# Patient Record
Sex: FEMALE | Race: WHITE | NOT HISPANIC OR LATINO | Employment: FULL TIME | URBAN - METROPOLITAN AREA
[De-identification: names, ages, dates, MRNs, and addresses within clinical notes are randomized per-mention and may not be internally consistent; named-entity substitution may affect disease eponyms.]

---

## 2018-05-15 ENCOUNTER — OFFICE VISIT (OUTPATIENT)
Dept: CARDIOLOGY | Facility: CLINIC | Age: 48
End: 2018-05-15
Payer: COMMERCIAL

## 2018-05-15 VITALS
WEIGHT: 166.5 LBS | SYSTOLIC BLOOD PRESSURE: 106 MMHG | OXYGEN SATURATION: 97 % | DIASTOLIC BLOOD PRESSURE: 67 MMHG | HEART RATE: 66 BPM

## 2018-05-15 DIAGNOSIS — I49.3 PVC'S (PREMATURE VENTRICULAR CONTRACTIONS): Primary | ICD-10-CM

## 2018-05-15 DIAGNOSIS — I49.3 PVC'S (PREMATURE VENTRICULAR CONTRACTIONS): ICD-10-CM

## 2018-05-15 DIAGNOSIS — R00.2 PALPITATIONS: ICD-10-CM

## 2018-05-15 LAB
BASOPHILS # BLD AUTO: 0 10E9/L (ref 0–0.2)
BASOPHILS NFR BLD AUTO: 0.2 %
DIFFERENTIAL METHOD BLD: NORMAL
EOSINOPHIL # BLD AUTO: 0.1 10E9/L (ref 0–0.7)
EOSINOPHIL NFR BLD AUTO: 2.2 %
ERYTHROCYTE [DISTWIDTH] IN BLOOD BY AUTOMATED COUNT: 13 % (ref 10–15)
HCT VFR BLD AUTO: 40.2 % (ref 35–47)
HGB BLD-MCNC: 13.1 G/DL (ref 11.7–15.7)
LYMPHOCYTES # BLD AUTO: 1.6 10E9/L (ref 0.8–5.3)
LYMPHOCYTES NFR BLD AUTO: 25.8 %
MCH RBC QN AUTO: 30.7 PG (ref 26.5–33)
MCHC RBC AUTO-ENTMCNC: 32.6 G/DL (ref 31.5–36.5)
MCV RBC AUTO: 94 FL (ref 78–100)
MONOCYTES # BLD AUTO: 0.6 10E9/L (ref 0–1.3)
MONOCYTES NFR BLD AUTO: 8.9 %
NEUTROPHILS # BLD AUTO: 4 10E9/L (ref 1.6–8.3)
NEUTROPHILS NFR BLD AUTO: 62.9 %
PLATELET # BLD AUTO: 261 10E9/L (ref 150–450)
RBC # BLD AUTO: 4.27 10E12/L (ref 3.8–5.2)
WBC # BLD AUTO: 6.3 10E9/L (ref 4–11)

## 2018-05-15 PROCEDURE — 99205 OFFICE O/P NEW HI 60 MIN: CPT | Performed by: INTERNAL MEDICINE

## 2018-05-15 PROCEDURE — 85025 COMPLETE CBC W/AUTO DIFF WBC: CPT | Performed by: INTERNAL MEDICINE

## 2018-05-15 PROCEDURE — 36415 COLL VENOUS BLD VENIPUNCTURE: CPT | Performed by: INTERNAL MEDICINE

## 2018-05-15 NOTE — MR AVS SNAPSHOT
After Visit Summary   5/15/2018    Melva Cazares    MRN: [id number]           Patient Information     Date Of Birth          1970        Visit Information        Provider Department      5/15/2018 10:30 AM Maryam Driver MD [facility] AT [facility]        Today's Diagnoses     PVC's (premature ventricular contractions)    -  1      Care Instructions    Thank you for coming to the [facility] Heart @ [facility]; please note the following instructions:    1. Dr. Maryam Driver has requested you to have the following blood test(s) CBC with Platelets. Please return downstairs to the main lobby to re-check in for your labs before you leave for home today. We will follow up with you once the results are posted.     2.  Dr. Maryam Driver has ordered an echocardiogram to be performed.  We have scheduled your echocardiogram appointment at the  [facility] Imaging Department ([address] [place] [address]) for 5/21/18 at 9:00am.  Please arrive 15 minutes early to allow time for registration.  The Cardiology Nurse will contact you regarding the results (please see result notification details at bottom of summary).    Echocardiogram Instructions:  -Wear comfortable clothing  -Refrain from wearing perfumes or scented lotions      3. Please call when you are having symptoms of heart fluttering and we will set up an appointment for you to come in and have a heart monitor applied.  Please call us at [phone].        If you have any questions regarding your visit please contact your care team:     Cardiology  Telephone Number   Ramandeep BOWMAN, CASSIA MILLIGAN, RN   Fifi LEE, ASHLY CURTIS MA   [phone]    *After hours: [phone]   For scheduling appts:     [phone] or    [phone] (select option 1)    *After hours: [phone]     For the Device Clinic (Pacemakers and ICD's)  RN's :  Jayleen Caba   During business hours:  [phone]    *After business hours:  [phone] (select option 4)      Normal test result notifications will be released via Rapt Media[employer] or mailed within 7 business days.  All other test results, will be communicated via telephone once reviewed by your cardiologist.    If you need a medication refill please contact your pharmacy.  Please allow 3 business days for your refill to be completed.    As always, thank you for trusting us with your health care needs!                  Follow-ups after your visit        Your next 10 appointments already scheduled     May 21, 2018  9:00 AM CDT   Ech Complete with [id number]   [facility] [place] ([facility])    [address] [place] [id number] [phone]           1. Please bring or wear a comfortable two-piece outfit. 2. You may eat, drink and take your normal medicines. 3. For any questions that cannot be answered, please contact the ordering physician              Future tests that were ordered for you today     Open Future Orders        Priority Expected Expires Ordered    Zio Patch Holter Routine 5/15/2018 3/14/2019 5/15/2018    CBC with platelets differential Routine 5/15/2018 7/15/2018 5/15/2018    Echocardiogram Routine 5/15/2018 9/15/2018 5/15/2018            Who to contact     If you have questions or need follow up information about today's clinic visit or your schedule please contact [facility] directly at [phone].  Normal or non-critical lab and imaging results will be communicated to you by MyChart, letter or phone within 4 business days after the clinic has received the results. If you do not hear from us within 7 days, please contact the clinic through MyChart or phone. If you have a critical or abnormal lab result, we will notify you by phone as soon as possible.  Submit refill requests through Plangot or call your pharmacy and they will forward  "the refill request to us. Please allow 3 business days for your refill to be completed.          Additional Information About Your Visit        MyChart Information     Ketto lets you send messages to your doctor, view your test results, renew your prescriptions, schedule appointments and more. To sign up, go to [email or web address] . Click on \"Log in\" on the left side of the screen, which will take you to the Welcome page. Then click on \"Sign up Now\" on the right side of the page.     You will be asked to enter the access code listed below, as well as some personal information. Please follow the directions to create your username and password.     Your access code is: [email or web address]  Expires: 2018 11:57 AM     Your access code will  in 90 days. If you need help or a new code, please call your [facility] clinic or [phone].        Care EveryWhere ID     This is your Care EveryWhere ID. This could be used by other organizations to access your [facility] medical records  [id number]        Your Vitals Were     Pulse Pulse Oximetry                66 97%           Blood Pressure from Last 3 Encounters:   05/15/18 106/67   11 121/71    Weight from Last 3 Encounters:   05/15/18 75.5 kg (166 lb 8 oz)               Primary Care Provider    None Specified       No primary provider on file.        Equal Access to Services     [facility] : Hadii kiki messero Sosameer, waaxda luqadaha, qaybta kaalmada burak, venkatesh yang. So [facility] [phone].    ATENCIÓN: Si habla español, tiene a perdomo disposición servicios gratuitos de asistencia lingüística. Llame al [phone].    We comply with applicable federal civil rights laws and [place] laws. We do not discriminate on the basis of race, color, national origin, age, disability, sex, sexual orientation, or gender identity.            Thank you!     Thank you for choosing [facility] AT [place] " FABIEN  for your care. Our goal is always to provide you with excellent care. Hearing back from our patients is one way we can continue to improve our services. Please take a few minutes to complete the written survey that you may receive in the mail after your visit with us. Thank you!             Your Updated Medication List - Protect others around you: Learn how to safely use, store and throw away your medicines at www.disposemymeds.org.          This list is accurate as of 5/15/18 11:57 AM.  Always use your most recent med list.                   Brand Name Dispense Instructions for use Diagnosis    CLARITIN PO      Take  by mouth.        MULTIVITAMIN ADULT PO

## 2018-05-15 NOTE — NURSING NOTE
Cardiac Monitors: Patient was instructed regarding the indication, function, care and prompt return of a holter  monitor (when pt feels palpitations, pt to call to have monitor placed). The monitor was placed on the patient with instructions regarding care of the skin electrodes and monitor, as well as documentation in the patient diary. Patient demonstrated understanding of this information and agreed to call with further questions or concerns.    Cardiac Testing: Patient given instructions regarding  echocardiogram on 5/21/18 in [place]. Discussed purpose, preparation, procedure and when to expect results reported back to the patient. Patient demonstrated understanding of this information and agreed to call with further questions or concerns.    Labs: Patient was instructed to return for the next laboratory testing today. Patient demonstrated understanding of this information and agreed to call with further questions or concerns.     Med Reconcile: Reviewed and verified all current medications with the patient. The updated medication list was printed and given to the patient.    Return Appointment: Patient given instructions regarding scheduling next clinic visit, depending on results of echo/zio/labs. Patient demonstrated understanding of this information and agreed to call with further questions or concerns.    Patient stated she understood all health information given and agreed to call with further questions or concerns.    Arabella Malagon RN      
Chief Complaint   Patient presents with     Palpitations      New pt appt with Dr. Driver for palpitations and PVC's. Was seen at urgent care ([facility]) in 2017 for same issue (see Care Everywhere).  Patient notes 3 episodes of her heart fluttering for 8-15 seconds at a time.  She noted left shoulder pain as well.          Initial /67 (BP Location: Right arm, Patient Position: Chair, Cuff Size: Adult Large)  Pulse 66  Wt 75.5 kg (166 lb 8 oz)  SpO2 97% There is no height or weight on file to calculate BMI..  BP completed using cuff size: large    Fifi Ricci, RMA        
EKG was done.  Kandi Lawton MA    
Skin normal color for race, warm, dry and intact. No evidence of rash.

## 2018-05-15 NOTE — PROGRESS NOTES
Chief complaint: fluttering     HPI: Ms. Melva Cazares is a 47 year old  female with PMH significant for PVCs.     is here to discuss her palpitations (she describes as sudden thump or pressure in her chest). She presented to [facility] Urgent care in 12/2017 with symptoms of fluttering. While she was monitored there , PVCs were noted on telemetry and her symptoms corresponded to the PVCs. Labs including BMP and TSH were normal. She was recommended further follow-up.  She describes three similar episodes since December 2017 lasting for a few days. No relation to physical activity. She exercises regularly and did not notice palpitations during exercise. She denies any associated feeling like dizziness or lightheadedness. She is not on any medication. She denies any drug abuse. Non-smoker. She denies a history of chest discomfort, dyspnea, PND, orthopnea, pedal edema and syncope.    The patient's risk factor profile is: (-) HTN, (-) diabetes, (-) hyperlipidemia, (-) tobacco use, (-) family Hx CAD.     I have reviewed her ECG today which shows sinus rhythm with no PVCs.     Medications, personal, family, and social history reviewed with patient and revised.    PAST MEDICAL HISTORY:  No past medical history on file.    CURRENT MEDICATIONS:  Current Outpatient Prescriptions   Medication Sig Dispense Refill     Loratadine (CLARITIN PO) Take  by mouth.         PAST SURGICAL HISTORY:  No past surgical history on file.    ALLERGIES:   No Known Allergies    FAMILY HISTORY:  No family history on file.      SOCIAL HISTORY:  Social History   Substance Use Topics     Smoking status: Former Smoker     Quit date: 8/2/1999     Smokeless tobacco: Never Used     Alcohol use Not on file       ROS:   Constitutional: No fever, chills, or sweats. Weight stable.   ENT: No visual disturbance, ear ache, epistaxis, sore throat.   Cardiovascular: As per HPI.   Respiratory: No cough, hemoptysis.    GI: No nausea, vomiting,  hematemesis, melena, or hematochezia.   : No hematuria.   Integument: Negative.   Psychiatric: Negative.   Hematologic:  Easy bruising, no easy bleeding.  Neuro: Negative.   Endocrinology: No significant heat or cold intolerance   Musculoskeletal: No myalgia.    Exam:  /67 (BP Location: Right arm, Patient Position: Chair, Cuff Size: Adult Large)  Pulse 66  Wt 75.5 kg (166 lb 8 oz)  SpO2 97%  GENERAL APPEARANCE: healthy, alert and no distress  HEENT: no icterus, no central cyanosis  LYMPH/NECK: no adenopathy, no asymmetry, JVP not elevated, no carotid bruits.  RESPIRATORY: lungs clear to auscultation - no rales, rhonchi or wheezes, no use of accessory muscles, no retractions, respirations are unlabored, normal respiratory rate  CARDIOVASCULAR: regular rhythm, normal S1, S2, no S3 or S4 and no murmur, click or rub, precordium quiet with normal PMI.  GI: soft, non tender  EXTREMITIES: peripheral pulses normal, no edema  NEURO: alert and oriented to person/place/time, normal speech,and affect  VASC: Radial, dorsalis pedis and posterior tibialis pulses are normal in volumes and symmetric bilaterally.   SKIN: no ecchymoses, no rashes     I have reviewed the labs and personally reviewed the imaging below and made my comment in the assessment and plan.    Labs: [facility]  2017  TSH:2.34 (normal), BUN:14, Cr:0.98, K:3.9 Ca:8.2 M.6  Glucose:91      Assessment and Plan:   Ms. Melva Cazares is a 47 year old  female with PMH significant for PVCs. So far she has felt 3 episodes lasting  for a few days since 2017. Last episode she noticed was few weeks ago. No hx of CAD or structural heart disease.  Today`s ECG is normal with no PVCs. Her BMP and TSH were normal. Physical exam is normal with normal vitals signs.  I was not able to see CBC from [facility] ED visit, so will order CBC.  I also recommended an echocardiogram.  I did not start any medication today. She herself endorsed going through  stressful days which can cause PVCs. She herself doesnot want to be on medication as well.  I asked her to call us next time she becomes symptomatic with PVCs again so that we can give her an ambulatory ECG monitor.    RTC PRN.      A total of 60 minutes spent face-to-face with greater than 50% of the time spent in counseling and coordinating cares of the issues above.   Please donot hesitate to contact me if you have any questions or concerns. Again, thank you for allowing me to participate in the care of your patient.    Maryam CORDOVA MD  [facility] Division of Cardiology  Pager [phone]

## 2018-05-15 NOTE — PATIENT INSTRUCTIONS
Thank you for coming to the [facility] Heart @ [facility]; please note the following instructions:    1. Dr. Maryam Driver has requested you to have the following blood test(s) CBC with Platelets. Please return downstairs to the main lobby to re-check in for your labs before you leave for home today. We will follow up with you once the results are posted.     2.  Dr. Maryam Driver has ordered an echocardiogram to be performed.  We have scheduled your echocardiogram appointment at the  [facility] Imaging Department ([address] [place] [address]) for 5/21/18 at 9:00am.  Please arrive 15 minutes early to allow time for registration.  The Cardiology Nurse will contact you regarding the results (please see result notification details at bottom of summary).    Echocardiogram Instructions:  -Wear comfortable clothing  -Refrain from wearing perfumes or scented lotions      3. Please call when you are having symptoms of heart fluttering and we will set up an appointment for you to come in and have a heart monitor applied.  Please call us at [phone].        If you have any questions regarding your visit please contact your care team:     Cardiology  Telephone Number   Ramandeep BOWMAN, RN  Arabella MILLIGAN, RN   Fifi LEE, ASHLY CURTIS MA   [phone]    *After hours: [phone]   For scheduling appts:     [phone] or    [phone] (select option 1)    *After hours: [phone]     For the Device Clinic (Pacemakers and ICD's)  RN's :  Jayleen Caba   During business hours: [phone]    *After business hours:  [phone] (select option 4)      Normal test result notifications will be released via Bacula or mailed within 7 business days.  All other test results, will be communicated via telephone once reviewed by your cardiologist.    If you need a medication refill please contact your pharmacy.  Please allow 3 business days for your refill to be completed.    As always, thank  you for trusting us with your health care needs!

## 2018-05-15 NOTE — LETTER
5/15/2018      RE: Melva Cazares  [address]  [place] [address]       Dear Colleague,    Thank you for the opportunity to participate in the care of your patient, Melva Cazares, at the [facility] PHYS[facility] AT [facility] at [facility]. Please see a copy of my visit note below.    Chief complaint: fluttering     HPI: Ms. Melva Cazares is a 47 year old  female with PMH significant for PVCs.     is here to discuss her palpitations (she describes as sudden thump or pressure in her chest). She presented to [facility]gent care in 12/2017 with symptoms of fluttering. While she was monitored there , PVCs were noted on telemetry and her symptoms corresponded to the PVCs. Labs including BMP and TSH were normal. She was recommended further follow-up.  She describes three similar episodes since December 2017 lasting for a few days. No relation to physical activity. She exercises regularly and did not notice palpitations during exercise. She denies any associated feeling like dizziness or lightheadedness. She is not on any medication. She denies any drug abuse. Non-smoker. She denies a history of chest discomfort, dyspnea, PND, orthopnea, pedal edema and syncope.    The patient's risk factor profile is: (-) HTN, (-) diabetes, (-) hyperlipidemia, (-) tobacco use, (-) family Hx CAD.     I have reviewed her ECG today which shows sinus rhythm with no PVCs.     Medications, personal, family, and social history reviewed with patient and revised.    PAST MEDICAL HISTORY:  No past medical history on file.    CURRENT MEDICATIONS:  Current Outpatient Prescriptions   Medication Sig Dispense Refill     Loratadine (CLARITIN PO) Take  by mouth.         PAST SURGICAL HISTORY:  No past surgical history on file.    ALLERGIES:   No Known Allergies    FAMILY HISTORY:  No family history on file.      SOCIAL HISTORY:  Social History   Substance Use Topics      Smoking status: Former Smoker     Quit date: 1999     Smokeless tobacco: Never Used     Alcohol use Not on file       ROS:   Constitutional: No fever, chills, or sweats. Weight stable.   ENT: No visual disturbance, ear ache, epistaxis, sore throat.   Cardiovascular: As per HPI.   Respiratory: No cough, hemoptysis.    GI: No nausea, vomiting, hematemesis, melena, or hematochezia.   : No hematuria.   Integument: Negative.   Psychiatric: Negative.   Hematologic:  Easy bruising, no easy bleeding.  Neuro: Negative.   Endocrinology: No significant heat or cold intolerance   Musculoskeletal: No myalgia.    Exam:  /67 (BP Location: Right arm, Patient Position: Chair, Cuff Size: Adult Large)  Pulse 66  Wt 75.5 kg (166 lb 8 oz)  SpO2 97%  GENERAL APPEARANCE: healthy, alert and no distress  HEENT: no icterus, no central cyanosis  LYMPH/NECK: no adenopathy, no asymmetry, JVP not elevated, no carotid bruits.  RESPIRATORY: lungs clear to auscultation - no rales, rhonchi or wheezes, no use of accessory muscles, no retractions, respirations are unlabored, normal respiratory rate  CARDIOVASCULAR: regular rhythm, normal S1, S2, no S3 or S4 and no murmur, click or rub, precordium quiet with normal PMI.  GI: soft, non tender  EXTREMITIES: peripheral pulses normal, no edema  NEURO: alert and oriented to person/place/time, normal speech,and affect  VASC: Radial, dorsalis pedis and posterior tibialis pulses are normal in volumes and symmetric bilaterally.   SKIN: no ecchymoses, no rashes     I have reviewed the labs and personally reviewed the imaging below and made my comment in the assessment and plan.    Labs: [facility]  2017  TSH:2.34 (normal), BUN:14, Cr:0.98, K:3.9 Ca:8.2 M.6  Glucose:91      Assessment and Plan:   Ms. Melva Cazares is a 47 year old  female with PMH significant for PVCs. So far she has felt 3 episodes lasting  for a few days since 2017. Last episode she noticed was few weeks ago. No hx  of CAD or structural heart disease.  Today`s ECG is normal with no PVCs. Her BMP and TSH were normal. Physical exam is normal with normal vitals signs.  I was not able to see CBC from [facility] ED visit, so will order CBC.  I also recommended an echocardiogram.  I did not start any medication today. She herself endorsed going through stressful days which can cause PVCs. She herself doesnot want to be on medication as well.  I asked her to call us next time she becomes symptomatic with PVCs again so that we can give her an ambulatory ECG monitor.    RTC PRN.      A total of 60 minutes spent face-to-face with greater than 50% of the time spent in counseling and coordinating cares of the issues above.   Please donot hesitate to contact me if you have any questions or concerns. Again, thank you for allowing me to participate in the care of your patient.    Maryam CORDOVA MD  [facility] Division of Cardiology  Pager [phone]    Please do not hesitate to contact me if you have any questions/concerns.

## 2018-06-26 ENCOUNTER — DOCUMENTATION ONLY (OUTPATIENT)
Dept: CARDIOLOGY | Facility: CLINIC | Age: 48
End: 2018-06-26

## 2018-06-26 NOTE — PROGRESS NOTES
Patient was called three time to reschedule echo appointment but now respond or call back. Final letter send on June 26, 2018. Will remove patient from workquene.  Agustina Car CMA    11:30 AM

## 2024-01-09 ENCOUNTER — OFFICE VISIT (OUTPATIENT)
Dept: URGENT CARE | Facility: URGENT CARE | Age: 54
End: 2024-01-09
Payer: COMMERCIAL

## 2024-01-09 VITALS
WEIGHT: 150 LBS | RESPIRATION RATE: 20 BRPM | OXYGEN SATURATION: 96 % | TEMPERATURE: 99 F | DIASTOLIC BLOOD PRESSURE: 70 MMHG | SYSTOLIC BLOOD PRESSURE: 113 MMHG | HEART RATE: 75 BPM

## 2024-01-09 DIAGNOSIS — J06.9 VIRAL URI WITH COUGH: Primary | ICD-10-CM

## 2024-01-09 DIAGNOSIS — R05.1 ACUTE COUGH: ICD-10-CM

## 2024-01-09 PROBLEM — F90.0 ADHD, PREDOMINANTLY INATTENTIVE TYPE: Status: ACTIVE | Noted: 2024-01-09

## 2024-01-09 PROBLEM — L70.9 ACNE: Status: ACTIVE | Noted: 2024-01-09

## 2024-01-09 PROBLEM — J32.9 CHRONIC RHINOSINUSITIS: Status: ACTIVE | Noted: 2024-01-09

## 2024-01-09 LAB
FLUAV AG SPEC QL IA: NEGATIVE
FLUBV AG SPEC QL IA: NEGATIVE

## 2024-01-09 PROCEDURE — 87804 INFLUENZA ASSAY W/OPTIC: CPT | Performed by: NURSE PRACTITIONER

## 2024-01-09 PROCEDURE — 87635 SARS-COV-2 COVID-19 AMP PRB: CPT | Performed by: NURSE PRACTITIONER

## 2024-01-09 PROCEDURE — 99203 OFFICE O/P NEW LOW 30 MIN: CPT | Performed by: NURSE PRACTITIONER

## 2024-01-09 NOTE — PROGRESS NOTES
Assessment & Plan     Viral URI with cough      Acute cough    - Influenza A & B Antigen - Clinic Collect  - Symptomatic COVID-19 Virus (Coronavirus) by PCR Nose     Influenza testing negative, COVID testing in process. Discussed symptoms viral and antibiotic not indicated currently. Mucus green from sitting and recommend nasal irrigation with neti pot twice daily, humidifier, steam, reducing dairy, warm liquids with honey and lemon, continue ibuprofen and Robitussin as needed. Discussed cough can linger for weeks. Discussed bronchitis is not a bacterial infection even though she used to receive abx for it. She requests abx again and discussed antibiotics will not help her at this time. She refuses chest xray to verify no pneumonia. Self-quarantine recommended    Follow-up with PCP if symptoms persist for 7 days, and sooner if symptoms worsen or new symptoms develop.     Discussed red flag symptoms which warrant immediate visit in emergency room    All questions were answered and patient verbalized understanding. AVS reviewed with patient.     Chikis Mitchell, DNP, APRN, CNP 1/9/2024 2:03 PM  [facility]          Gonzalo Frye is a 53 year old female who presents to clinic today for the following health issues:  Chief Complaint   Patient presents with    Cough     Started Yesterday - Coughing Up Green Phlegm. Hx of Bronchitis     Nose Problem     Congested     Headache    Vomiting     On Monday Several Times      Patient presents for evaluation of cough which started 2 days ago. Associated symptoms: nasal congestion, headache, vomiting yesterday, temp 99F which started today. Cough is productive with green mucus and blowing green mucus. She states she has a bacterial infection and needs an antibiotic. Has a history of bronchitis and tobacco use, not a current smoker.     Denies fever, shortness of breath, diarrhea. She threw up 5 times 2 days ago, none yesterday or today. No known  exposures. Has been taking ibuprofen 800 mg at 11am and  Robitussin long acting cough medication which helps temporarily. No known exposures.     Problem list, Medication list, Allergies, and Medical history reviewed in EPIC.    ROS:  Review of systems negative except for noted above        Objective    /70   Pulse 75   Temp 99  F (37.2  C)   Resp 20   Wt 68 kg (150 lb)   SpO2 96%   Physical Exam  Constitutional:       General: She is not in acute distress.     Appearance: She is not toxic-appearing or diaphoretic.   HENT:      Head: Normocephalic and atraumatic.      Right Ear: Tympanic membrane, ear canal and external ear normal.      Left Ear: Tympanic membrane, ear canal and external ear normal.      Nose:      Right Sinus: No maxillary sinus tenderness or frontal sinus tenderness.      Left Sinus: No maxillary sinus tenderness or frontal sinus tenderness.      Comments: Mild nasal congestion     Mouth/Throat:      Mouth: Mucous membranes are moist.      Pharynx: Oropharynx is clear. No oropharyngeal exudate or posterior oropharyngeal erythema.      Tonsils: No tonsillar abscesses. 0 on the right. 0 on the left.   Eyes:      Conjunctiva/sclera: Conjunctivae normal.   Cardiovascular:      Rate and Rhythm: Normal rate and regular rhythm.      Heart sounds: Normal heart sounds.   Pulmonary:      Effort: Pulmonary effort is normal. No respiratory distress.      Breath sounds: Normal breath sounds. No wheezing, rhonchi or rales.   Lymphadenopathy:      Cervical: No cervical adenopathy.   Skin:     General: Skin is warm and dry.   Neurological:      Mental Status: She is alert.              Labs:  Results for orders placed or performed in visit on 24   Influenza A & B Antigen - Clinic Collect     Status: Normal    Specimen: Nose; Swab   Result Value Ref Range    Influenza A antigen Negative Negative    Influenza B antigen Negative Negative    Narrative    Test results must be correlated with  clinical data. If necessary, results should be confirmed by a molecular assay or viral culture.

## 2024-01-10 ENCOUNTER — TELEPHONE (OUTPATIENT)
Dept: NURSING | Facility: CLINIC | Age: 54
End: 2024-01-10
Payer: COMMERCIAL

## 2024-01-10 LAB — SARS-COV-2 RNA RESP QL NAA+PROBE: POSITIVE

## 2024-01-10 NOTE — TELEPHONE ENCOUNTER
Patient classified as COVID treatment eligible by Epic high risk algorithm:  No    Coronavirus (COVID-19) Notification    Reason for call  Notify of POSITIVE COVID-19 lab result, assess symptoms,  review [facility] recommendations    Lab Result   Lab test for 2019-nCoV rRt-PCR or SARS-COV-2 PCR  Oropharyngeal AND/OR nasopharyngeal swabs were POSITIVE for 2019-nCoV RNA [OR] SARS-COV-2 RNA (COVID-19) RNA     We have been unable to reach patient by phone at this time to notify of their Positive COVID-19 result.    Left voicemail message requesting a call back to [phone] [facility] for results.        A Positive COVID-19 letter will be sent via "[employer] or the mail.    ARACELIS MORRISSEY

## 2024-02-10 ENCOUNTER — HEALTH MAINTENANCE LETTER (OUTPATIENT)
Age: 54
End: 2024-02-10

## 2024-05-03 ENCOUNTER — OFFICE VISIT (OUTPATIENT)
Dept: URGENT CARE | Facility: URGENT CARE | Age: 54
End: 2024-05-03
Payer: COMMERCIAL

## 2024-05-03 VITALS
WEIGHT: 144 LBS | RESPIRATION RATE: 16 BRPM | TEMPERATURE: 98.2 F | SYSTOLIC BLOOD PRESSURE: 116 MMHG | HEART RATE: 69 BPM | DIASTOLIC BLOOD PRESSURE: 57 MMHG | OXYGEN SATURATION: 97 %

## 2024-05-03 DIAGNOSIS — R05.1 ACUTE COUGH: Primary | ICD-10-CM

## 2024-05-03 DIAGNOSIS — J30.2 SEASONAL ALLERGIC RHINITIS, UNSPECIFIED TRIGGER: ICD-10-CM

## 2024-05-03 DIAGNOSIS — J06.9 VIRAL UPPER RESPIRATORY INFECTION: ICD-10-CM

## 2024-05-03 LAB
FLUAV AG SPEC QL IA: NEGATIVE
FLUBV AG SPEC QL IA: NEGATIVE

## 2024-05-03 PROCEDURE — 87804 INFLUENZA ASSAY W/OPTIC: CPT | Performed by: PHYSICIAN ASSISTANT

## 2024-05-03 PROCEDURE — 99214 OFFICE O/P EST MOD 30 MIN: CPT | Performed by: PHYSICIAN ASSISTANT

## 2024-05-03 PROCEDURE — 87635 SARS-COV-2 COVID-19 AMP PRB: CPT | Performed by: PHYSICIAN ASSISTANT

## 2024-05-03 RX ORDER — BENZONATATE 100 MG/1
100 CAPSULE ORAL 3 TIMES DAILY PRN
Qty: 30 CAPSULE | Refills: 0 | Status: SHIPPED | OUTPATIENT
Start: 2024-05-03 | End: 2024-05-13

## 2024-05-03 RX ORDER — METHYLPHENIDATE HYDROCHLORIDE 18 MG/1
18 TABLET ORAL PRN
COMMUNITY
Start: 2024-03-15

## 2024-05-03 RX ORDER — ONDANSETRON 4 MG/1
4 TABLET, ORALLY DISINTEGRATING ORAL EVERY 8 HOURS PRN
Qty: 15 TABLET | Refills: 0 | Status: SHIPPED | OUTPATIENT
Start: 2024-05-03

## 2024-05-03 NOTE — PROGRESS NOTES
Chief Complaint   Patient presents with    Cough     Coughing with burning and chest tightness since Wednesday. Coughing up mucus with fatigue.         Results for orders placed or performed in visit on 05/03/24   Influenza A & B Antigen     Status: Normal    Specimen: Nose; Swab   Result Value Ref Range    Influenza A antigen Negative Negative    Influenza B antigen Negative Negative    Narrative    Test results must be correlated with clinical data. If necessary, results should be confirmed by a molecular assay or viral culture.             ASSESSMENT:     ICD-10-CM    1. Acute cough  R05.1 methylphenidate HCl ER, OSM, (CONCERTA) 18 MG CR tablet     Influenza A & B Antigen     Symptomatic COVID-19 Virus (Coronavirus) by PCR Nose     ondansetron (ZOFRAN ODT) 4 MG ODT tab     benzonatate (TESSALON) 100 MG capsule      2. Viral upper respiratory infection  J06.9 benzonatate (TESSALON) 100 MG capsule      3. Seasonal allergic rhinitis, unspecified trigger  J30.2 benzonatate (TESSALON) 100 MG capsule            PLAN: 53-year-old with acute cough for 48 hours, had COVID 4 months ago.  Viral respiratory illness.  Tessalon Perles.  Given Zofran in case she develops nausea with this as she did with COVID 4 months ago. Covid test pending.  I have discussed clinical findings with patient.  Side effects of medications discussed.  Symptomatic care is discussed.  I have discussed the possibility of  worsening symptoms and indication to RTC or go to the ER if they occur.  All questions are answered, patient indicates understanding of these issues and is in agreement with plan.   Patient care instructions are discussed/given at the end of visit.   Lots of rest and fluids.      Berenice Devries PA-C      SUBJECTIVE:  53-year-old female who had COVID 4 months ago presents for new onset of cough and bodyaches over the past 48 hours.  No fever.  During COVID had lots of nausea and Zofran prescription did help.  She is worried  with this cough she will again develop nausea and wants refill of the Zofran.  No vomiting or diarrhea.  No rash.  History of seasonal allergies for which she takes Claritin.  No history of asthma.      No Known Allergies    No past medical history on file.    Current Outpatient Medications   Medication Sig Dispense Refill    methylphenidate HCl ER, OSM, (CONCERTA) 18 MG CR tablet Take 18 mg by mouth as needed      clindamycin (CLEOCIN T) 1 % external solution Apply  topically to affected area(s) 2 times daily. As needed (Patient not taking: Reported on 5/3/2024)      Flaxseed, Linseed, (FLAXSEED OIL) 1000 MG CAPS Take 1,000 mg by mouth (Patient not taking: Reported on 5/3/2024)      glucosamine-chondroitin 500-400 MG CAPS per capsule Take 1 capsule by mouth (Patient not taking: Reported on 5/3/2024)      Loratadine (CLARITIN PO) Take  by mouth. (Patient not taking: Reported on 5/3/2024)      Multiple Vitamin (MULTI VITAMIN W/D-3) TABS take 1 tablet by oral route once daily with food (Patient not taking: Reported on 5/3/2024)      Multiple Vitamins-Minerals (MULTIVITAMIN ADULT PO)  (Patient not taking: Reported on 5/3/2024)       No current facility-administered medications for this visit.       Social History     Tobacco Use    Smoking status: Former     Current packs/day: 0.00     Types: Cigarettes     Quit date: 1999     Years since quittin.7    Smokeless tobacco: Never   Substance Use Topics    Alcohol use: Yes     Comment: couple glasses of wine per month       ROS:  CONSTITUTIONAL: Negative for fatigue or fever.  EYES: Negative for eye problems.  ENT: As above.  RESP: As above.  CV: Negative for chest pains.  GI: Negative for vomiting.  MUSCULOSKELETAL:  Negative for significant muscle or joint pains.  NEUROLOGIC: Negative for headaches.  SKIN: Negative for rash.  PSYCH: Normal mentation for age.    OBJECTIVE:  /57 (BP Location: Left arm, Patient Position: Sitting, Cuff Size: Adult Regular)    Pulse 69   Temp 98.2  F (36.8  C) (Tympanic)   Resp 16   Wt 65.3 kg (144 lb)   SpO2 97%   GENERAL APPEARANCE: Healthy, alert and no distress.  EYES:Conjunctiva/sclera clear.  EARS: No cerumen.   Ear canals w/o erythema.  TM's intact w/o erythema.    NOSE/MOUTH: Nose without ulcers, erythema or lesions.  SINUSES: No maxillary sinus tenderness.  THROAT: No erythema w/o tonsillar enlargement . No exudates.  NECK: Supple, nontender, no lymphadenopathy.  RESP: Lungs clear to auscultation - no rales, rhonchi or wheezes  CV: Regular rate and rhythm, normal S1 S2, no murmur noted.  NEURO: Awake, alert    SKIN: No rashes      Berenice Devries PA-C

## 2024-05-04 LAB — SARS-COV-2 RNA RESP QL NAA+PROBE: NEGATIVE

## 2025-06-22 ENCOUNTER — HEALTH MAINTENANCE LETTER (OUTPATIENT)
Age: 55
End: 2025-06-22